# Patient Record
Sex: FEMALE | Race: WHITE | URBAN - METROPOLITAN AREA
[De-identification: names, ages, dates, MRNs, and addresses within clinical notes are randomized per-mention and may not be internally consistent; named-entity substitution may affect disease eponyms.]

---

## 2022-01-28 ENCOUNTER — HOME HEALTH ADMISSION (OUTPATIENT)
Dept: HOME HEALTH SERVICES | Facility: HOME HEALTH | Age: 77
End: 2022-01-28
Payer: MEDICARE

## 2022-01-31 ENCOUNTER — HOME CARE VISIT (OUTPATIENT)
Dept: HOME HEALTH SERVICES | Facility: HOME HEALTH | Age: 77
End: 2022-01-31
Payer: MEDICARE

## 2022-01-31 ENCOUNTER — HOME CARE VISIT (OUTPATIENT)
Dept: SCHEDULING | Facility: HOME HEALTH | Age: 77
End: 2022-01-31
Payer: MEDICARE

## 2022-01-31 VITALS
OXYGEN SATURATION: 97 % | SYSTOLIC BLOOD PRESSURE: 132 MMHG | HEART RATE: 65 BPM | RESPIRATION RATE: 15 BRPM | DIASTOLIC BLOOD PRESSURE: 65 MMHG

## 2022-01-31 PROCEDURE — 3331090002 HH PPS REVENUE DEBIT

## 2022-01-31 PROCEDURE — 400018 HH-NO PAY CLAIM PROCEDURE

## 2022-01-31 PROCEDURE — 400013 HH SOC

## 2022-01-31 PROCEDURE — 3331090001 HH PPS REVENUE CREDIT

## 2022-01-31 PROCEDURE — G0151 HHCP-SERV OF PT,EA 15 MIN: HCPCS

## 2022-01-31 NOTE — HOME HEALTH
S: patient reports that she is doing well and getting around well with the walker   O: PAIN: patient is taking pain medication as needed, educated patient on use of ice for pain and edema   patient to apply ice for pain and swelling control per MD protocol using a barrier to protect the skin. Patient to monitor the skin for any adverse effects such as color changes, irritation, mottled appearance. patient to use the ice on the right hip for 20 minutes an hour for the first 2-3 days and then reduce to 20 minutes 4-5 times a day  patient   WOUND:R anterior hip incision covered in mepilex dressing - no drainage noted   per Dr Cano Burn protocol - to be removed at POD 7 (2/4/2022)  ROM: R hip flexion in supine heel slide 74 degrees  R hip flexion in standing 72 degrees with some compensatory hip hiking   STRENGTH: R knee ext 3+/5, R knee flexion 3+/5, R hip flex 2-/5, R hip abd/adduction 2-/5  L knee flexion and extension 5/5, L hip flexion/abduction and adduction 5/5  BED MOBILITY: patient demonstrated independence with bed mobility with good ability to lift the right LE in and out of the bed  EQUIPMENT: FWW, patient to get a SPC   TRANSFERS: patient completed sit to stand from the couch, bed and the toilet with exaggerated OPAL, maintenance of the R LE extended out in front of her, full weight shift to the left LE to completely unweight the R LE. Requires use of walker for initial stance for balance, safety and to unweight the R LE. GAIT: ambulating with FWW with reduced weight bearing through the R LE (max weight through UE on the walker- but able to reduce the UE weight bearing when cued), step through pattern with use of hip hiking and pelvic rotation for R LE swing through, reduced foot clearance B and reduced terminal knee extension on the R for initial contact/terminal swing.   walked on even surfaces with FWW with SBA and cues for gait correction for 25 feet x 2.   STEPS: one step to enter and egress the garage, steps at the front door to egress the house  RESPONSE TO TREATMENT: patient demonstrated an improvement in pain with loosening of the hip through exercise and mobility, she responded well to verbal cues for gait corrections, and she demonstrated safe technique with all of the exercises   RESPONSE TO EDUCATION:patient was able to verbalize, repeat back and demonstrate all home exercises   A:ASSESSMENT AND PROGRESS TOWARD GOALS:  Patient demonstrated a positive result to therapy this date as evidenced by an improvement in gait pattern with cues, decreased pain with exercise and walking as the hip loosens, and patient expressing an understanding of the rehab plan due to therapy verbal and written instructions. Goals established for increased independence in the home, safe mobility in the home, improvement in strength and ROM - all designed to reduce fall risk and progress toward independence. Patient will benefit from continued PT intervention to progress toward meeting all established goals    P:.continue with progression of mobility, ther ex for strength, ROM, provided education to patient and caregivers to maximize her recovery and reduce the fall risk to progress toward a return to independent function      Skilled services/Home bound verification: MD referral for HHPT following recent hospital stay. HHPT is medically necessary to address  dx and clinical findings: decreased ROM, decreased strength, impaired gait, decreased ability w stair negotiation, increased swelling, decreased transfer status, decreased endurance, decreased balance and decreased safety, increased pain in order to improve functional mobility/quality of life, decrease burden of care, reduce risk for re-hospitalization, work towards patient's personal goals of return to PLOF w decrease risk for falls.     Skilled Reason for admission/summary of clinical condition:  s/p R THR by Dr Carlita Lowe    Caregiver: patient usually lives alone in a one story house with a step to enter and egress the house - her daughter/primary caregiver is currently staying with her for about a week to assist as needed with meals, medications, ADLs, mobility and transportation    Medications reconciled and all medications are available in the home this visit. The following education was provided regarding medications, medication interactions, and look a like medications. Meds are effective at this time. no discrepancies noted  patient was educated on side effects and risks of oxycodone and tramadol and the need to follow the prescription as written by the MD    Home health supplies ordered/delivered this visit include: none needed    Patient education provided: safety, fall risk, HEP, signs of infection. Patient/caregiver degree of understanding:good    Home exercise program: supine ankle pumps, quad sets, heel slides, SAQ  seated LAQ, isometric hip adduction   walking every hour during waking hours     Pt/Caregiver instructed on plan of care and are agreeable to plan of care      Plan of care called to attending MD: Dr Yessica Rojo written copy of the [facility] of Rights was given and verbally reviewed on this visit. . The patient or representative was given the opportunity to ask pertinent questions regarding the bill of rights. Clarissa Aceevdo of rights information was received by patient    Discharge planning discussed with patient and caregiver. DC to self and family under MD supervision when all goals are met or maximum potential is reached. Pt/Caregiver did verbalize understanding of DC planning. Next MD appointment Dr Luly Corley 2/24/2022. Patient/caregiver instructed to keep appointment as lack of follow through with MD appointment could result in discontinuation of home care services for non-compliance.

## 2022-02-01 PROCEDURE — 3331090001 HH PPS REVENUE CREDIT

## 2022-02-01 PROCEDURE — 3331090002 HH PPS REVENUE DEBIT

## 2022-02-02 ENCOUNTER — HOME CARE VISIT (OUTPATIENT)
Dept: SCHEDULING | Facility: HOME HEALTH | Age: 77
End: 2022-02-02
Payer: MEDICARE

## 2022-02-02 PROCEDURE — 3331090002 HH PPS REVENUE DEBIT

## 2022-02-02 PROCEDURE — G0157 HHC PT ASSISTANT EA 15: HCPCS

## 2022-02-02 PROCEDURE — 3331090001 HH PPS REVENUE CREDIT

## 2022-02-03 PROCEDURE — 3331090001 HH PPS REVENUE CREDIT

## 2022-02-03 PROCEDURE — 3331090002 HH PPS REVENUE DEBIT

## 2022-02-04 ENCOUNTER — HOME CARE VISIT (OUTPATIENT)
Dept: SCHEDULING | Facility: HOME HEALTH | Age: 77
End: 2022-02-04
Payer: MEDICARE

## 2022-02-04 ENCOUNTER — HOME CARE VISIT (OUTPATIENT)
Dept: HOME HEALTH SERVICES | Facility: HOME HEALTH | Age: 77
End: 2022-02-04
Payer: MEDICARE

## 2022-02-04 VITALS
RESPIRATION RATE: 16 BRPM | SYSTOLIC BLOOD PRESSURE: 131 MMHG | OXYGEN SATURATION: 99 % | DIASTOLIC BLOOD PRESSURE: 79 MMHG | TEMPERATURE: 97.9 F | HEART RATE: 65 BPM

## 2022-02-04 PROCEDURE — 3331090002 HH PPS REVENUE DEBIT

## 2022-02-04 PROCEDURE — 3331090001 HH PPS REVENUE CREDIT

## 2022-02-04 PROCEDURE — G0157 HHC PT ASSISTANT EA 15: HCPCS

## 2022-02-04 NOTE — HOME HEALTH
SUBJECTIVE: Patient reports she is sleeping well. CAREGIVER INVOLVEMENT/ASSISTANCE NEEDED FOR: Daughter is assisting patient for errands and transportation. Patient is managing her own meds  [address] ORDERED/DELIVERED THIS VISIT INCLUDE: None  OBJECTIVE:  See interventions. PATIENT RESPONSE TO TREATMENT:  Good no increase in pain reported follwoing treatment-patient has very limited pain overall  PATIENT LEVEL OF UNDERSTANDING OF EDUCATION PROVIDED: Patient able to foloow cues and instruction for good technique with slow steady movements for exercises with HEP review  ASSESSMENT OF PROGRESS TOWARD GOALS: Patient able to improve safe walker distancing and consistent B heel strike with cues and instruction with gait training by return demonstration. Progressing well towards goals. CONTINUED NEED FOR THE FOLLOWING SKILLS: Patient will be seen 3  x week for Physical Therapy to continue to work toward goals within POC and HHPT is medically necessary to address  dx and clinical findings: decreased ROM, decreased strength, impaired gait, decreased ability w stair negotiation, increased swelling, decreased bed mobility, decreased transfer status, decreased endurance, decreased balance and decreased safety, increased pain in order to improve functional mobility/quality of life, decrease burden of care, reduce risk for re-hospitalization, work towards patient's personal goals of return to PLOF w decrease risk for falls. PLAN FOR NEXT VISIT: Gait/transfer training, strengthening exercises  THE FOLLOWING DISCHARGE PLANNING WAS DISCUSSED WITH THE PATIENT/CAREGIVER: This is visit number 2  out of  9  planned visits.   NEXT MD APPT:02/24 follow  up appt with Dr Earnestine Genao

## 2022-02-05 PROCEDURE — 3331090001 HH PPS REVENUE CREDIT

## 2022-02-05 PROCEDURE — 3331090002 HH PPS REVENUE DEBIT

## 2022-02-06 PROCEDURE — 3331090001 HH PPS REVENUE CREDIT

## 2022-02-06 PROCEDURE — 3331090002 HH PPS REVENUE DEBIT

## 2022-02-07 ENCOUNTER — HOME CARE VISIT (OUTPATIENT)
Dept: SCHEDULING | Facility: HOME HEALTH | Age: 77
End: 2022-02-07
Payer: MEDICARE

## 2022-02-07 VITALS
HEART RATE: 68 BPM | DIASTOLIC BLOOD PRESSURE: 77 MMHG | OXYGEN SATURATION: 96 % | SYSTOLIC BLOOD PRESSURE: 131 MMHG | TEMPERATURE: 97.6 F

## 2022-02-07 PROCEDURE — 3331090002 HH PPS REVENUE DEBIT

## 2022-02-07 PROCEDURE — G0157 HHC PT ASSISTANT EA 15: HCPCS

## 2022-02-07 PROCEDURE — 3331090001 HH PPS REVENUE CREDIT

## 2022-02-07 NOTE — HOME HEALTH
SUBJECTIVE: Patient reports she is very pleased with overall progress. CAREGIVER INVOLVEMENT/ASSISTANCE NEEDED FOR: Family, Daughter, to guard with shower, errands , transportation, Patient is able to manage meds and meals. HOME HEALTH SUPPLIES BY TYPE AND QUANTITY ORDERED/DELIVERED THIS VISIT INCLUDE: to obtain cane  OBJECTIVE:  See interventions. PATIENT RESPONSE TO TREATMENT:  Patient reports her daughter took her bandage off. Tape intact-good tolerance to overall treatment with no report of increased pain  PATIENT LEVEL OF UNDERSTANDING OF EDUCATION PROVIDED: Patient able to follow proper sequencing cues for 1 step to enter/exit home. ASSESSMENT OF PROGRESS TOWARD GOALS: Addition of B dorsiflexion stretch to assist patient with consistent B foot clearance to decrease overall fall risk. AROM of R hip lfexion increased by measurement in standing with support to 90 degrees from 72 degrees. Progressing well towards goals. CONTINUED NEED FOR THE FOLLOWING SKILLS: Patient will be seen 3  x week for Physical Therapy to continue to work toward goals within POC and HHPT is medically necessary to address  dx and clinical findings: decreased ROM, decreased strength, impaired gait, decreased ability w stair negotiation, increased swelling, decreased bed mobility, decreased transfer status, decreased endurance, decreased balance and decreased safety, increased pain in order to improve functional mobility/quality of life, decrease burden of care, reduce risk for re-hospitalization, work towards patient's personal goals of return to PLOF w decrease risk for falls. PLAN FOR NEXT VISIT: Gait/transfer training, strengthening exercises  THE FOLLOWING DISCHARGE PLANNING WAS DISCUSSED WITH THE PATIENT/CAREGIVER: This is visit number 3  out of  9  planned visits.   NEXT MD APPT:2/24/22 Dr Jacklyn Michaels

## 2022-02-08 VITALS
HEART RATE: 66 BPM | RESPIRATION RATE: 16 BRPM | DIASTOLIC BLOOD PRESSURE: 68 MMHG | SYSTOLIC BLOOD PRESSURE: 116 MMHG | TEMPERATURE: 97.3 F | OXYGEN SATURATION: 98 %

## 2022-02-08 PROCEDURE — 3331090001 HH PPS REVENUE CREDIT

## 2022-02-08 PROCEDURE — 3331090002 HH PPS REVENUE DEBIT

## 2022-02-08 NOTE — HOME HEALTH
SUBJECTIVE: Patient states she feels she has been doing vey well and she got a new single point cane from her son. CAREGIVER INVOLVEMENT/ASSISTANCE NEEDED FOR: Transportation/errands    HOME HEALTH SUPPLIES BY TYPE AND QUANTITY ORDERED/DELIVERED THIS VISIT INCLUDE: none    OBJECTIVE:  See interventions. PATIENT RESPONSE TO TREATMENT:  Patient reports no increase in pain following treatment    PATIENT LEVEL OF UNDERSTANDING OF EDUCATION PROVIDED: Patient was instructed to put her heel down first to promote heel strike by increasing B clearance from floor. Following instruction, patient was able to safely clear B feet from floor via return demonstration with focus. ASSESSMENT OF PROGRESS TOWARD GOALS: Patient was able to increase her R hip flexion measurement  from 72 degrees at initial eval, to 78 degrees today measured in standing. .  Able to advance patient to single point cane allwoing for increased functional independence  Following education, patient was able to display a functional and safe reciprocal gait pattern when using single point cane. Instructed patient to return to [facility] if pain/edema increase, if ill, dizziness or with fatigue, and inclement weather-while on pain meds to utilize FWW for night trips to the bathroom-patient agreed. CONTINUED NEED FOR THE FOLLOWING SKILLS: Patient will be seen 3 x week for Physical Therapy to continue to work toward goals within POC and HHPT is medically necessary to address  dx and clinical findings: decreased ROM, decreased strength, impaired gait, decreased ability w stair negotiation, increased swelling, decreased bed mobility, decreased transfer status, decreased endurance, decreased balance and decreased safety, increased pain in order to improve functional mobility/quality of life, decrease burden of care, reduce risk for re-hospitalization, work towards patient's personal goals of return to [facility] w decrease risk for falls.     PLAN FOR NEXT VISIT: Gait training on uneven surface, strengthening ex, transfer training, pt edu    THE FOLLOWING DISCHARGE PLANNING WAS DISCUSSED WITH THE PATIENT/CAREGIVER: This is visit number 4  out of   9 planned visits.     NEXT MD APPT: 2/24 - follow up with Dr. Kulwant Murphy

## 2022-02-09 ENCOUNTER — HOME CARE VISIT (OUTPATIENT)
Dept: SCHEDULING | Facility: HOME HEALTH | Age: 77
End: 2022-02-09
Payer: MEDICARE

## 2022-02-09 PROCEDURE — 3331090001 HH PPS REVENUE CREDIT

## 2022-02-09 PROCEDURE — G0157 HHC PT ASSISTANT EA 15: HCPCS

## 2022-02-09 PROCEDURE — 3331090002 HH PPS REVENUE DEBIT

## 2022-02-10 ENCOUNTER — HOME CARE VISIT (OUTPATIENT)
Dept: HOME HEALTH SERVICES | Facility: HOME HEALTH | Age: 77
End: 2022-02-10
Payer: MEDICARE

## 2022-02-10 PROCEDURE — 3331090001 HH PPS REVENUE CREDIT

## 2022-02-10 PROCEDURE — 3331090002 HH PPS REVENUE DEBIT

## 2022-02-10 PROCEDURE — G0157 HHC PT ASSISTANT EA 15: HCPCS

## 2022-02-11 PROCEDURE — 3331090002 HH PPS REVENUE DEBIT

## 2022-02-11 PROCEDURE — 3331090001 HH PPS REVENUE CREDIT

## 2022-02-12 VITALS
TEMPERATURE: 96.8 F | HEART RATE: 57 BPM | RESPIRATION RATE: 12 BRPM | HEART RATE: 69 BPM | RESPIRATION RATE: 20 BRPM | OXYGEN SATURATION: 97 % | DIASTOLIC BLOOD PRESSURE: 64 MMHG | SYSTOLIC BLOOD PRESSURE: 123 MMHG | TEMPERATURE: 96.9 F | OXYGEN SATURATION: 97 % | DIASTOLIC BLOOD PRESSURE: 64 MMHG | SYSTOLIC BLOOD PRESSURE: 124 MMHG

## 2022-02-12 PROCEDURE — 3331090002 HH PPS REVENUE DEBIT

## 2022-02-12 PROCEDURE — 3331090001 HH PPS REVENUE CREDIT

## 2022-02-12 NOTE — HOME HEALTH
SUBJECTIVE: Patient reports she has had an easier time getting off of her couch from sit to standing position, and has been walking to her mailbox to get her mail daily. CAREGIVER INVOLVEMENT/ASSISTANCE NEEDED FOR: Family, Daughter, to guard with shower, errands , transportation, Patient is able to manage meds and meals. HOME HEALTH SUPPLIES BY TYPE AND QUANTITY ORDERED/DELIVERED THIS VISIT INCLUDE: none    OBJECTIVE:  See interventions. PATIENT RESPONSE TO TREATMENT: Patient reports no increase in pain following treatment. PATIENT LEVEL OF UNDERSTANDING OF EDUCATION PROVIDED: Patient was cued to lift R foot more during gait training as she is unable to consistently perform R heel strike in order to safely clear foot from floor. Once patient was given cues she was able to increase foot clearance via return demonstration, reducing her fall risk. Patient was questioned when to use FWW instead of single point cane - patient indicated to use her FWW when she feels less confident, weaker, or going to the bathroom at night - displaying good carryover from previous treatment. ASSESSMENT OF PROGRESS TOWARD GOALS: Patient's gait pattern presents with unqual step length-R step is slightly shorter than her L. Patients R dorsiflexion appears to have slightly improved from last session, she seems to be having a positive reaction to her gastroc/soleus stretch in standing position on 2 books - instructed to her in previous treatment.       CONTINUED NEED FOR THE FOLLOWING SKILLS: Patient will be seen 3  x week for Physical Therapy to continue to work toward goals within POC and HHPT is medically necessary to address  dx and clinical findings: decreased ROM, decreased strength, impaired gait, decreased ability w stair negotiation, increased swelling, decreased bed mobility, decreased transfer status, decreased endurance, decreased balance and decreased safety, increased pain in order to improve functional mobility/quality of life, decrease burden of care, reduce risk for re-hospitalization, work towards patient's personal goals of return to PLOF w decrease risk for falls. PLAN FOR NEXT VISIT: Gait/transfer training, strengthening exercises    THE FOLLOWING DISCHARGE PLANNING WAS DISCUSSED WITH THE PATIENT/CAREGIVER: This is visit number 5  out of  9  planned visits.     NEXT MD APPT:2/24/22 Dr Marino Nichols

## 2022-02-12 NOTE — HOME HEALTH
SUBJECTIVE: Patient states she she is very pleased with overall progress-she stil reports fatigue at the end of the day     CAREGIVER INVOLVEMENT/ASSISTANCE NEEDED FOR:       [address] ORDERED/DELIVERED THIS VISIT INCLUDE: none      OBJECTIVE:  See interventions. PATIENT RESPONSE TO TREATMENT:  Patient reports no increase in pain     PATIENT LEVEL OF U NDERSTANDING OF EDUCATION PROVIDED: Good-patient able to make imporvements with gait after instruction by return demonstration. ASSESSMENT OF PROGRESS TOWARD GOALS: Mod I with car transfer in/out of a truck following instruction by return demonstration. Improved gait quality by more consistent equalized step length and B heel strike with focus from patient and instruction decreasing overall fall risk. CONTINUED NEED FOR THE FOL LOWING SKILLS: Patient will be seen 3 x week for Physical Therapy to continue to work toward goals within POC and HHPT is medically necessary to address  dx and clinical findings: decreased ROM, decreased strength, impaired gait, decreased ability w stair negotiation, increased swelling, decreased bed mobility, decreased transfer status, decreased endurance, decreased balance and decreased safety, increased pain in order to improve func tional mobility/quality of life, decrease burden of care, reduce risk for re-hospitalization, work towards patient's personal goals of return to PLOF w decrease risk for falls. PLAN FOR NEXT VISIT: Gait training on uneven surface, strengthening ex, transfer training, pt edu      THE FOLLOWING DISCHARGE PLANNING WAS DISCUSSED WITH THE PATIENT/CAREGIVER: This is visit number 6 out of 9 planned visits.       NEXT MD APPT: 2/24 - follow up  with Dr. Jacklyn Michaels

## 2022-02-13 PROCEDURE — 3331090001 HH PPS REVENUE CREDIT

## 2022-02-13 PROCEDURE — 3331090002 HH PPS REVENUE DEBIT

## 2022-02-14 PROCEDURE — 3331090001 HH PPS REVENUE CREDIT

## 2022-02-14 PROCEDURE — 3331090002 HH PPS REVENUE DEBIT

## 2022-02-15 ENCOUNTER — HOME CARE VISIT (OUTPATIENT)
Dept: HOME HEALTH SERVICES | Facility: HOME HEALTH | Age: 77
End: 2022-02-15
Payer: MEDICARE

## 2022-02-15 PROCEDURE — G0157 HHC PT ASSISTANT EA 15: HCPCS

## 2022-02-15 PROCEDURE — 3331090001 HH PPS REVENUE CREDIT

## 2022-02-15 PROCEDURE — 3331090002 HH PPS REVENUE DEBIT

## 2022-02-16 VITALS
HEART RATE: 64 BPM | DIASTOLIC BLOOD PRESSURE: 65 MMHG | OXYGEN SATURATION: 97 % | TEMPERATURE: 97.3 F | SYSTOLIC BLOOD PRESSURE: 129 MMHG | RESPIRATION RATE: 16 BRPM

## 2022-02-16 PROCEDURE — 3331090002 HH PPS REVENUE DEBIT

## 2022-02-16 PROCEDURE — 3331090001 HH PPS REVENUE CREDIT

## 2022-02-16 NOTE — HOME HEALTH
Freddy Praker reports she is pleased with overall progress. CAREGIVER INVOLVEMENT/ASSISTANCE NEEDED FOR:            HOME HEALTH SUPPLIES BY TYPE AND QUANTITY ORDERED/DELIVERED THIS VISIT INCLUDE: none           OBJECTIVE:  See interventions. PATIENT RESPONSE TO TREATMENT:  Patient reports no increase in pain following treatment          PATIENT LEVEL OF U NDERSTANDING OF EDUCATION PROVIDED:.Patient is able to improve gait pattern follwoing cues and instruction          ASSESSMENT OF PROGRESS TOWARD GOALS: Working on consistency with gait quality and for consistency with B foot clearance to decrease overall fall risk. Patient is Mod I with to enter/exit home with 1 small step however she is able to verbalize to ascend with L LE and to descend with R LE if she attempts more steps. CONTINUED NEED FOR THE FOLLOWING SKILLS: Patient will be seen 3 x week for Physical Therapy to continue to work toward goals within POC and HHPT is medically necessary to address  dx and clinical findings: decreased ROM, decreased strength, impaired gait, decreased ability w stair negotiation, increased swelling, decreased bed mobility, decreased transfer status, decreased endurance, decreased balance and decreased safety, increased pain in order to improve func tional mobility/quality of life, decrease burden of care, reduce risk for re-hospitalization, work towards patient's personal goals of return to PLOF w decrease risk for falls. PLAN FOR NEXT VISIT: Gait/transfer training, strengthening exercises          THE FOLLOWING DISCHARGE PLANNING WAS DISCUSSED WITH THE PATIENT/CAREGIVER: This is visit number 7 out of 9 planned visits.            NEXT MD APPT: 2/24 - follow up  with Dr. Lobito Mishra

## 2022-02-17 ENCOUNTER — HOME CARE VISIT (OUTPATIENT)
Dept: HOME HEALTH SERVICES | Facility: HOME HEALTH | Age: 77
End: 2022-02-17
Payer: MEDICARE

## 2022-02-17 PROCEDURE — 3331090002 HH PPS REVENUE DEBIT

## 2022-02-17 PROCEDURE — 3331090001 HH PPS REVENUE CREDIT

## 2022-02-17 PROCEDURE — G0157 HHC PT ASSISTANT EA 15: HCPCS

## 2022-02-18 PROCEDURE — 3331090001 HH PPS REVENUE CREDIT

## 2022-02-18 PROCEDURE — 3331090002 HH PPS REVENUE DEBIT

## 2022-02-19 PROCEDURE — 3331090001 HH PPS REVENUE CREDIT

## 2022-02-19 PROCEDURE — 3331090002 HH PPS REVENUE DEBIT

## 2022-02-20 PROCEDURE — 3331090001 HH PPS REVENUE CREDIT

## 2022-02-20 PROCEDURE — 3331090002 HH PPS REVENUE DEBIT

## 2022-02-21 ENCOUNTER — HOME CARE VISIT (OUTPATIENT)
Dept: SCHEDULING | Facility: HOME HEALTH | Age: 77
End: 2022-02-21
Payer: MEDICARE

## 2022-02-21 ENCOUNTER — HOME CARE VISIT (OUTPATIENT)
Dept: HOME HEALTH SERVICES | Facility: HOME HEALTH | Age: 77
End: 2022-02-21
Payer: MEDICARE

## 2022-02-21 VITALS
HEART RATE: 57 BPM | SYSTOLIC BLOOD PRESSURE: 126 MMHG | TEMPERATURE: 97.6 F | OXYGEN SATURATION: 98 % | DIASTOLIC BLOOD PRESSURE: 68 MMHG | RESPIRATION RATE: 16 BRPM

## 2022-02-21 VITALS
DIASTOLIC BLOOD PRESSURE: 78 MMHG | OXYGEN SATURATION: 98 % | SYSTOLIC BLOOD PRESSURE: 122 MMHG | TEMPERATURE: 97.5 F | HEART RATE: 73 BPM | RESPIRATION RATE: 14 BRPM

## 2022-02-21 PROCEDURE — G0151 HHCP-SERV OF PT,EA 15 MIN: HCPCS

## 2022-02-21 PROCEDURE — 3331090001 HH PPS REVENUE CREDIT

## 2022-02-21 PROCEDURE — 3331090002 HH PPS REVENUE DEBIT

## 2022-02-21 NOTE — Clinical Note
Patient has been seen for home care PT following a R THR by Dr Quique Myers. She has made great progress in all areas. She in independent with all ADLs at this time and all transfers in and out of the home. She is walking without a device with independence on even and uneven surfaces with symmetrical pattern and good safety awareness. She is able to egress and enter her house independently via 1 step at the front door. Current range of motion is R hip flexion 90 degrees. She is independent with her HEP and consistent with follow through.  She will follow up with Dr Quique Myers this week and is being discharged from home care PT today

## 2022-02-21 NOTE — HOME HEALTH
S: patient reports that she is so happy with her progress and she is not using the cane anymore  O: PAIN: patient is taking pain medication (only OTC acetaminophen)  as needed  her pain is very well controlled at this time  she reports that she sometimes gets a \"twinge\" at night when sleeping on the anterior right thigh but she is able to change positions and go back to sleep    WOUND:R anterior hip incision covered in surgical tape only - starting to peel up from the edges - advised patient she can trim the edges as they peel up  no signs of infection note, no drainage    ROM: R hip flexion in supine heel slide 74 degrees AT EVALUATION  R hip flexion in standing 72 degrees with some compensatory hip hiking AT EVALUATION  R hip flexion in standing 90 degrees AT DISCHARGE    STRENGTH: R knee ext 3+/5, R knee flexion 3+/5, R hip flex 2-/5, R hip abd/adduction 2-/5 AT EVALUATION  L knee flexion and extension 5/5, L hip flexion/abduction and adduction 5/5 AT EVALUATION  R knee ext 4+/5, R knee flexion 4+/5, R hip flex 3-/5, R hip abd/adduction 3-/5 AT DISCHARGE    BED MOBILITY: independent with bed mobility    EQUIPMENT: has a FWW an SPC but currently not using either     TRANSFERS: independent with all transfers in and out of the home   able to weight bear equally during stand to sit and sit to stand    GAIT: walking without a device with independence on even and uneven surfaces with symmetrical pattern and good safety awareness. STEPS: independent with one step to enter and egress the garage    RESPONSE TO TREATMENT: patient demonstrated all wlaking, mobility and exercises with minimal c/p pain or discomfort - she is moving well with good body mechanics and safety- she was able to demonstrate good safety and technique with exercises    RESPONSE TO EDUCATION:patient was able to verbalize, repeat back and demonstrate all home exercises.  she was able to repeat back signs of infection, HEP, increasing activity as tolerated    A:ASSESSMENT AND PROGRESS TOWARD GOALS:  Patient has been seen for home care PT following a R THR by Dr Rita Crowell. She has made great progress in all areas. She in independent with all ADLs at this time and all transfers in and out of the home. She is walking without a device with independence on even and uneven surfaces with symmetrical pattern and good safety awareness. She is able to egress and enter her house independently via 1 step at the front door. Current range of motion is R hip flexion 90 degrees. She is independent with her HEP and consistent with follow through. She will follow up with Dr Rita Crowell this week and is being discharged from home care PT today  P: DC PT      Skilled services/Home bound verification: MD referral for HHPT following recent hospital stay. HHPT is medically necessary to address  dx and clinical findings: decreased ROM, decreased strength, impaired gait, decreased ability w stair negotiation, increased swelling, decreased transfer status, decreased endurance, decreased balance and decreased safety, increased pain in order to improve functional mobility/quality of life, decrease burden of care, reduce risk for re-hospitalization, work towards patient's personal goals of return to PLOF w decrease risk for falls. Skilled Reason for admission/summary of clinical condition:  s/p R THR by Dr Rita Crowell    Caregiver: patient usually lives alone in a one story house with a step to enter and egress the house - her daughter/primary caregiver is currently staying with her for about a week to assist as needed with transportation    Medications reconciled and all medications are available in the home this visit. The following education was provided regarding medications, medication interactions, and look a like medications. Meds are effective at this time.  no discrepancies noted    Home health supplies ordered/delivered this visit include: none needed    Patient education provided: safety, fall risk, HEP, signs of infection.   Patient/caregiver degree of understanding:good    Home exercise program:supine ankle pumps, quad sets, heel slides, SAQ   standing exercises-with support of the kitchen counter: B heel/toe raises,  R hip abd, R hamstring curls 1 x 15   B dorsiflexion stretch in standing on 2 books with support

## 2022-02-21 NOTE — HOME HEALTH
SUBJECTIVE:Patient reports she enjoys walking outside. CAREGIVER INVOLVEMENT/ASSISTANCE NEEDED FOR:  transprotation/errnads-from family          [facility] SUPPLIES BY TYPE AND QUANTITY ORDERED/DELIVERED THIS VISIT INCLUDE: none           OBJECTIVE:  See interventions. PATIENT RESPONSE TO TREATMENT:  Patient reports no increase in pain following treatment          PATIENT LEVEL OF U NDERSTANDING OF EDUCATION PROVIDED:.Patient is able to follow cues for improved mobility           ASSESSMENT OF PROGRESS TOWARD GOALS: Tinetti score 28/28 placing patient at a low fall risk level. Patient is Independent with amb within home and Mod I with cane on uneven surfaces and transfers within home are Independent. CONTINUED NEED FOR THE FOLLOWING SKILLS: Patient will be seen 3 x week for Physical Therapy to continue to work toward goals within POC and HHPT is medically necessary to address  dx and clinical findings: decreased ROM, decreased strength, impaired gait, decreased ability w stair negotiation, increased swelling, decreased bed mobility, decreased transfer status, decreased endurance, decreased balance and decreased safety, increased pain in order to improve func tional mobility/quality of life, decrease burden of care, reduce risk for re-hospitalization, work towards patient's personal goals of return to PLOF w decrease risk for falls. PLAN FOR NEXT VISIT: Discharge assessment from Supervising physical therapist       THE FOLLOWING DISCHARGE PLANNING WAS DISCUSSED WITH THE PATIENT/CAREGIVER: This is visit number 8 out of 9 planned visits.  Patient plans to discuss outpatient therapy at follow up visit with Dr Trell Torres MD APPT: 2/24 - follow up  with Dr. Rachael Akhtar